# Patient Record
Sex: MALE | Race: WHITE | Employment: FULL TIME | URBAN - METROPOLITAN AREA
[De-identification: names, ages, dates, MRNs, and addresses within clinical notes are randomized per-mention and may not be internally consistent; named-entity substitution may affect disease eponyms.]

---

## 2017-08-20 ENCOUNTER — APPOINTMENT (OUTPATIENT)
Dept: NUCLEAR MEDICINE | Facility: HOSPITAL | Age: 42
DRG: 394 | End: 2017-08-20
Attending: EMERGENCY MEDICINE
Payer: COMMERCIAL

## 2017-08-20 ENCOUNTER — HOSPITAL ENCOUNTER (INPATIENT)
Facility: HOSPITAL | Age: 42
LOS: 3 days | Discharge: HOME | DRG: 394 | End: 2017-08-23
Attending: EMERGENCY MEDICINE | Admitting: HOSPITALIST
Payer: COMMERCIAL

## 2017-08-20 DIAGNOSIS — K62.5 RECTAL BLEEDING: Primary | ICD-10-CM

## 2017-08-20 PROBLEM — R73.9 HYPERGLYCEMIA: Status: ACTIVE | Noted: 2017-08-20

## 2017-08-20 PROBLEM — D64.9 ANEMIA: Status: ACTIVE | Noted: 2017-08-20

## 2017-08-20 LAB
ALBUMIN SERPL-MCNC: 3.3 G/DL (ref 3.5–4.8)
ALP LIVER SERPL-CCNC: 56 U/L (ref 45–117)
ALT SERPL-CCNC: 41 U/L (ref 17–63)
ANTIBODY SCREEN: NEGATIVE
AST SERPL-CCNC: 26 U/L (ref 15–41)
ATRIAL RATE: 72 BPM
BASOPHILS # BLD AUTO: 0.03 X10(3) UL (ref 0–0.1)
BASOPHILS # BLD AUTO: 0.03 X10(3) UL (ref 0–0.1)
BASOPHILS NFR BLD AUTO: 0.4 %
BASOPHILS NFR BLD AUTO: 0.5 %
BILIRUB SERPL-MCNC: 0.2 MG/DL (ref 0.1–2)
BUN BLD-MCNC: 16 MG/DL (ref 8–20)
CALCIUM BLD-MCNC: 8.1 MG/DL (ref 8.3–10.3)
CHLORIDE: 105 MMOL/L (ref 101–111)
CO2: 27 MMOL/L (ref 22–32)
CREAT BLD-MCNC: 1.11 MG/DL (ref 0.7–1.3)
EOSINOPHIL # BLD AUTO: 0.12 X10(3) UL (ref 0–0.3)
EOSINOPHIL # BLD AUTO: 0.15 X10(3) UL (ref 0–0.3)
EOSINOPHIL NFR BLD AUTO: 2.2 %
EOSINOPHIL NFR BLD AUTO: 2.2 %
ERYTHROCYTE [DISTWIDTH] IN BLOOD BY AUTOMATED COUNT: 15.7 % (ref 11.5–16)
ERYTHROCYTE [DISTWIDTH] IN BLOOD BY AUTOMATED COUNT: 15.7 % (ref 11.5–16)
GLUCOSE BLD-MCNC: 115 MG/DL (ref 70–99)
HCT VFR BLD AUTO: 25.6 % (ref 37–53)
HCT VFR BLD AUTO: 26.3 % (ref 37–53)
HGB BLD-MCNC: 8.2 G/DL (ref 13–17)
HGB BLD-MCNC: 8.2 G/DL (ref 13–17)
IMMATURE GRANULOCYTE COUNT: 0.02 X10(3) UL (ref 0–1)
IMMATURE GRANULOCYTE COUNT: 0.04 X10(3) UL (ref 0–1)
IMMATURE GRANULOCYTE RATIO %: 0.4 %
IMMATURE GRANULOCYTE RATIO %: 0.6 %
LYMPHOCYTES # BLD AUTO: 2.04 X10(3) UL (ref 0.9–4)
LYMPHOCYTES # BLD AUTO: 2.48 X10(3) UL (ref 0.9–4)
LYMPHOCYTES NFR BLD AUTO: 35.7 %
LYMPHOCYTES NFR BLD AUTO: 37.2 %
M PROTEIN MFR SERPL ELPH: 6.7 G/DL (ref 6.1–8.3)
MCH RBC QN AUTO: 26 PG (ref 27–33.2)
MCH RBC QN AUTO: 26.7 PG (ref 27–33.2)
MCHC RBC AUTO-ENTMCNC: 31.2 G/DL (ref 31–37)
MCHC RBC AUTO-ENTMCNC: 32 G/DL (ref 31–37)
MCV RBC AUTO: 83.4 FL (ref 80–99)
MCV RBC AUTO: 83.5 FL (ref 80–99)
MONOCYTES # BLD AUTO: 0.36 X10(3) UL (ref 0.1–0.6)
MONOCYTES # BLD AUTO: 0.56 X10(3) UL (ref 0.1–0.6)
MONOCYTES NFR BLD AUTO: 6.6 %
MONOCYTES NFR BLD AUTO: 8.1 %
NEUTROPHIL ABS PRELIM: 2.92 X10 (3) UL (ref 1.3–6.7)
NEUTROPHIL ABS PRELIM: 3.68 X10 (3) UL (ref 1.3–6.7)
NEUTROPHILS # BLD AUTO: 2.92 X10(3) UL (ref 1.3–6.7)
NEUTROPHILS # BLD AUTO: 3.68 X10(3) UL (ref 1.3–6.7)
NEUTROPHILS NFR BLD AUTO: 53 %
NEUTROPHILS NFR BLD AUTO: 53.1 %
P AXIS: 4 DEGREES
P-R INTERVAL: 178 MS
PLATELET # BLD AUTO: 268 10(3)UL (ref 150–450)
PLATELET # BLD AUTO: 304 10(3)UL (ref 150–450)
POTASSIUM SERPL-SCNC: 3.8 MMOL/L (ref 3.6–5.1)
Q-T INTERVAL: 420 MS
QRS DURATION: 88 MS
QTC CALCULATION (BEZET): 459 MS
R AXIS: 12 DEGREES
RBC # BLD AUTO: 3.07 X10(6)UL (ref 4.3–5.7)
RBC # BLD AUTO: 3.15 X10(6)UL (ref 4.3–5.7)
RED CELL DISTRIBUTION WIDTH-SD: 46.8 FL (ref 35.1–46.3)
RED CELL DISTRIBUTION WIDTH-SD: 47.5 FL (ref 35.1–46.3)
RH BLOOD TYPE: POSITIVE
SODIUM SERPL-SCNC: 140 MMOL/L (ref 136–144)
T AXIS: 7 DEGREES
VENTRICULAR RATE: 72 BPM
WBC # BLD AUTO: 5.5 X10(3) UL (ref 4–13)
WBC # BLD AUTO: 6.9 X10(3) UL (ref 4–13)

## 2017-08-20 PROCEDURE — 99223 1ST HOSP IP/OBS HIGH 75: CPT | Performed by: INTERNAL MEDICINE

## 2017-08-20 PROCEDURE — 78278 ACUTE GI BLOOD LOSS IMAGING: CPT | Performed by: EMERGENCY MEDICINE

## 2017-08-20 RX ORDER — POTASSIUM CHLORIDE 14.9 MG/ML
20 INJECTION INTRAVENOUS ONCE
Status: COMPLETED | OUTPATIENT
Start: 2017-08-20 | End: 2017-08-20

## 2017-08-20 RX ORDER — SODIUM CHLORIDE 9 MG/ML
INJECTION, SOLUTION INTRAVENOUS ONCE
Status: DISCONTINUED | OUTPATIENT
Start: 2017-08-20 | End: 2017-08-23

## 2017-08-20 RX ORDER — SODIUM CHLORIDE 9 MG/ML
INJECTION, SOLUTION INTRAVENOUS CONTINUOUS
Status: DISCONTINUED | OUTPATIENT
Start: 2017-08-20 | End: 2017-08-23

## 2017-08-20 RX ORDER — SODIUM CHLORIDE 9 MG/ML
INJECTION, SOLUTION INTRAVENOUS CONTINUOUS
Status: ACTIVE | OUTPATIENT
Start: 2017-08-20 | End: 2017-08-20

## 2017-08-20 NOTE — ED INITIAL ASSESSMENT (HPI)
Pt states has bright red blood in stools every bm x 2 weeks, saw gastro on Friday scheduled for colon on Tuesday but hgb was 9 but if still bleeding go to er

## 2017-08-20 NOTE — H&P
[facility]ITAL[facility]                                                               History & Physical         Nadyne Tillamook Patient Status:  Observation    1975 MRN [id number]   [facility] 3NW-A Attending MD Chika Horne Admission:  Methylcellulose, Laxative, (CITRUCEL) Oral Powder Take 1 rounded tablespoon in a large glass of water by mouth daily Disp: 850 g Rfl: 11   Hydrocortisone Acetate (ANUSOL-HC) 25 MG Rectal Suppos Place 1 suppository (25 mg total) rectally 2 (two) of acute blood loss, with dizziness-follow CBC, transfuse as needed. Quality:  · DVT Prophylaxis: SCD.   No pharmacological prophylaxis due to rectal bleeding  · CODE status: full  · Lundy: no    Plan of care discussed with patient and patient's wife

## 2017-08-20 NOTE — ED PROVIDER NOTES
Patient Seen in: [facility] Emergency Department    History   Patient presents with:  Dizziness (neurologic)  Bleeding (hematologic)    Stated Complaint: dizzy, rectal bleeding    HPI    Patient is a 23-year-old with a history of cholecystectomy and HPI.    Physical Exam   ED Triage Vitals  BP: 136/69 [08/20/17 1026]  Pulse: 75 [08/20/17 1026]  Resp: 18 [08/20/17 1026]  Temp: 98.5 °F (36.9 °C) [08/20/17 1026]  Temp src: n/a  SpO2: 98 % [08/20/17 1026]  O2 Device: None (Room air) [08/20/17 1105]    Cur QRS.           ============================================================  ED Course  ------------------------------------------------------------       MDM     40year-old with right red blood per rectum for 2 weeks and hemoglobin down to 8.2.   I spoke

## 2017-08-20 NOTE — CONSULTS
[facility]    Report of Consultation    José Miguel Negrete Patient Status:  Observation    1975 MRN [id number]   [facility] 3NW-A Attending Namita Sanchez MD   Hosp Day # 0 PCP Alicia Marino MD, Mahendra Johnson MD     Date of Adm history of stroke, recent passing out, recent dizziness, convulsions/seizures, dementia.   Cardiovascular: Denies history of heart murmur, leg swelling, history of rheumatic fever, pacemaker, chest pain or pressure after eating or when upset, automatic defi distention. Thyroid not enlarged. No lymphadenopathy. CV: S1 and S2 normal.  No murmurs or gallops. Lungs: Clear to auscultation. Abdomen: Soft and nondistended. Nontender. No masses. Bowel sounds are present. Back: No CVA tenderness.   Extremities:

## 2017-08-20 NOTE — PLAN OF CARE
GASTROINTESTINAL - ADULT    • Maintains or returns to baseline bowel function Not Progressing        HEMATOLOGIC - ADULT    • Free from bleeding injury Not Progressing        Patient/Family Goals    • Patient/Family Long Term Goal Not Progressing

## 2017-08-21 ENCOUNTER — APPOINTMENT (OUTPATIENT)
Dept: NUCLEAR MEDICINE | Facility: HOSPITAL | Age: 42
DRG: 394 | End: 2017-08-21
Attending: INTERNAL MEDICINE
Payer: COMMERCIAL

## 2017-08-21 ENCOUNTER — ANESTHESIA EVENT (OUTPATIENT)
Dept: ENDOSCOPY | Facility: HOSPITAL | Age: 42
DRG: 394 | End: 2017-08-21
Payer: COMMERCIAL

## 2017-08-21 ENCOUNTER — SURGERY (OUTPATIENT)
Age: 42
End: 2017-08-21

## 2017-08-21 ENCOUNTER — ANESTHESIA (OUTPATIENT)
Dept: ENDOSCOPY | Facility: HOSPITAL | Age: 42
DRG: 394 | End: 2017-08-21
Payer: COMMERCIAL

## 2017-08-21 LAB
ALBUMIN SERPL-MCNC: 3.1 G/DL (ref 3.5–4.8)
ALP LIVER SERPL-CCNC: 45 U/L (ref 45–117)
ALT SERPL-CCNC: 35 U/L (ref 17–63)
AST SERPL-CCNC: 21 U/L (ref 15–41)
BASOPHILS # BLD AUTO: 0.02 X10(3) UL (ref 0–0.1)
BASOPHILS NFR BLD AUTO: 0.4 %
BILIRUB SERPL-MCNC: 0.3 MG/DL (ref 0.1–2)
BUN BLD-MCNC: 9 MG/DL (ref 8–20)
CALCIUM BLD-MCNC: 7.9 MG/DL (ref 8.3–10.3)
CHLORIDE: 110 MMOL/L (ref 101–111)
CO2: 26 MMOL/L (ref 22–32)
CREAT BLD-MCNC: 0.86 MG/DL (ref 0.7–1.3)
EOSINOPHIL # BLD AUTO: 0.04 X10(3) UL (ref 0–0.3)
EOSINOPHIL NFR BLD AUTO: 0.7 %
ERYTHROCYTE [DISTWIDTH] IN BLOOD BY AUTOMATED COUNT: 15.8 % (ref 11.5–16)
GLUCOSE BLD-MCNC: 106 MG/DL (ref 70–99)
HCT VFR BLD AUTO: 23.2 % (ref 37–53)
HGB BLD-MCNC: 7 G/DL (ref 13–17)
HGB BLD-MCNC: 7.2 G/DL (ref 13–17)
HGB BLD-MCNC: 7.9 G/DL (ref 13–17)
IMMATURE GRANULOCYTE COUNT: 0.02 X10(3) UL (ref 0–1)
IMMATURE GRANULOCYTE RATIO %: 0.4 %
LYMPHOCYTES # BLD AUTO: 1.29 X10(3) UL (ref 0.9–4)
LYMPHOCYTES NFR BLD AUTO: 24 %
M PROTEIN MFR SERPL ELPH: 6.2 G/DL (ref 6.1–8.3)
MCH RBC QN AUTO: 26.3 PG (ref 27–33.2)
MCHC RBC AUTO-ENTMCNC: 31 G/DL (ref 31–37)
MCV RBC AUTO: 84.7 FL (ref 80–99)
MONOCYTES # BLD AUTO: 0.29 X10(3) UL (ref 0.1–0.6)
MONOCYTES NFR BLD AUTO: 5.4 %
NEUTROPHIL ABS PRELIM: 3.72 X10 (3) UL (ref 1.3–6.7)
NEUTROPHILS # BLD AUTO: 3.72 X10(3) UL (ref 1.3–6.7)
NEUTROPHILS NFR BLD AUTO: 69.1 %
PLATELET # BLD AUTO: 262 10(3)UL (ref 150–450)
POTASSIUM SERPL-SCNC: 3.7 MMOL/L (ref 3.6–5.1)
RBC # BLD AUTO: 2.74 X10(6)UL (ref 4.3–5.7)
RED CELL DISTRIBUTION WIDTH-SD: 48.2 FL (ref 35.1–46.3)
SODIUM SERPL-SCNC: 144 MMOL/L (ref 136–144)
WBC # BLD AUTO: 5.4 X10(3) UL (ref 4–13)

## 2017-08-21 PROCEDURE — 0DJD8ZZ INSPECTION OF LOWER INTESTINAL TRACT, VIA NATURAL OR ARTIFICIAL OPENING ENDOSCOPIC: ICD-10-PCS | Performed by: INTERNAL MEDICINE

## 2017-08-21 PROCEDURE — 99232 SBSQ HOSP IP/OBS MODERATE 35: CPT | Performed by: INTERNAL MEDICINE

## 2017-08-21 PROCEDURE — 30233N1 TRANSFUSION OF NONAUTOLOGOUS RED BLOOD CELLS INTO PERIPHERAL VEIN, PERCUTANEOUS APPROACH: ICD-10-PCS | Performed by: INTERNAL MEDICINE

## 2017-08-21 RX ORDER — DIPHENHYDRAMINE HYDROCHLORIDE 50 MG/ML
12.5 INJECTION INTRAMUSCULAR; INTRAVENOUS AS NEEDED
Status: DISCONTINUED | OUTPATIENT
Start: 2017-08-21 | End: 2017-08-21 | Stop reason: HOSPADM

## 2017-08-21 RX ORDER — SODIUM CHLORIDE 9 MG/ML
INJECTION, SOLUTION INTRAVENOUS ONCE
Status: COMPLETED | OUTPATIENT
Start: 2017-08-21 | End: 2017-08-21

## 2017-08-21 RX ORDER — SODIUM CHLORIDE, SODIUM LACTATE, POTASSIUM CHLORIDE, CALCIUM CHLORIDE 600; 310; 30; 20 MG/100ML; MG/100ML; MG/100ML; MG/100ML
INJECTION, SOLUTION INTRAVENOUS CONTINUOUS
Status: DISCONTINUED | OUTPATIENT
Start: 2017-08-21 | End: 2017-08-23

## 2017-08-21 RX ORDER — ONDANSETRON 2 MG/ML
4 INJECTION INTRAMUSCULAR; INTRAVENOUS AS NEEDED
Status: DISCONTINUED | OUTPATIENT
Start: 2017-08-21 | End: 2017-08-21 | Stop reason: HOSPADM

## 2017-08-21 RX ORDER — NALOXONE HYDROCHLORIDE 0.4 MG/ML
80 INJECTION, SOLUTION INTRAMUSCULAR; INTRAVENOUS; SUBCUTANEOUS AS NEEDED
Status: DISCONTINUED | OUTPATIENT
Start: 2017-08-21 | End: 2017-08-21 | Stop reason: HOSPADM

## 2017-08-21 RX ORDER — HYDROMORPHONE HYDROCHLORIDE 1 MG/ML
0.4 INJECTION, SOLUTION INTRAMUSCULAR; INTRAVENOUS; SUBCUTANEOUS EVERY 5 MIN PRN
Status: DISCONTINUED | OUTPATIENT
Start: 2017-08-21 | End: 2017-08-21 | Stop reason: HOSPADM

## 2017-08-21 RX ORDER — POTASSIUM CHLORIDE 14.9 MG/ML
20 INJECTION INTRAVENOUS ONCE
Status: COMPLETED | OUTPATIENT
Start: 2017-08-21 | End: 2017-08-21

## 2017-08-21 RX ORDER — METOCLOPRAMIDE HYDROCHLORIDE 5 MG/ML
10 INJECTION INTRAMUSCULAR; INTRAVENOUS AS NEEDED
Status: DISCONTINUED | OUTPATIENT
Start: 2017-08-21 | End: 2017-08-21 | Stop reason: HOSPADM

## 2017-08-21 NOTE — PROGRESS NOTES
8/21/17 CALL PLACED TO DR Kelly Carbajal TO INFORM HIM THAT NUCLEAR MED TEST (MECKELS DIVERTICULUM) CANNOT BE DONE TONIGHT PER [facility] NUCLEAR MED STAFF REPORT AS \"THERE IS STILL SOME LEFT OVERS OF THE GI BLOOD LOSS SCAN FROM YESTERDAY IN HIS SYSTEM THAT WILL AFFECT

## 2017-08-21 NOTE — H&P
History & Physical Examination    Patient Name: Shirley Guerin  MRN: [id number]  [facility]: [id number]  YOB: 1975    Diagnosis: Rectal bleeding [K62.5]      Present Illness:  Shirley Guerin is a 40year old male is here Rectal bleeding [K6

## 2017-08-21 NOTE — ANESTHESIA POSTPROCEDURE EVALUATION
[address] [place] [address] Patient Status:  Inpatient   Age/Gender 40year old male MRN [id number]   Location [facility]. Attending Chandler Latham MD   Hosp Day # 1 PCP Kiki Vázquez MD, Mannie Pederson MD       Anesthesia Post-op No

## 2017-08-21 NOTE — ANESTHESIA PREPROCEDURE EVALUATION
PRE-OP EVALUATION    Patient Name: Sherry Head    Pre-op Diagnosis: ADD ON    Procedure(s):  COLONOSCOPY WITH MAC    Surgeon(s) and Role:     * Chico Altman MD - Primary    Pre-op vitals reviewed.   Temp: 97.9 °F (36.6 °C)  Pulse: 70  Resp: 1 08/21/2017   MCV 83.9 08/18/2017   MCH 26.3 (L) 08/21/2017   MCH 26.4 (L) 08/18/2017   MCHC 31.0 08/21/2017   MCHC 31.5 08/18/2017   RDW 15.8 08/21/2017   RDW 16.0 08/18/2017   .0 08/21/2017    08/18/2017       Lab Results  Component Value Da

## 2017-08-21 NOTE — OPERATIVE REPORT
OPERATIVE REPORT   PATIENT NAME: Keeley John  MRN: [id number]  DATE OF OPERATION: 8/21/2017  PREOPERATIVE DIAGNOSIS: rectal bleeding  POSTOPERATIVE DIAGNOSES   1. No diverticulosis  2.  Normal ileum without Meckel's but ileoscopy only up to 15 cms  3 medication list.   PREP Quality indicators:  Aronchick scale    EXCELLENT - small volume of clear liquid > 95% of mucosa see  GOOD  - clear liquid covering up to 25% of mucosa, but > 90% of mucosa seen  FAIR  - some semisolid stool could be suctioned but >

## 2017-08-21 NOTE — PROGRESS NOTES
8/21/17 PER PT REQUEST, CALL PLACED TO DR JAMES TO CLARIFY BLOOD TRANSFUSION ORDER. PT STATES \"I THOUGHT THEY WERE GOING TO RECHECK MY HGB AT 1130 AND THEN GIVE ME BLOOD IF IT IS STILL LOW. I WOULD RATHER NOT GET SOMEONE ELSE'S BLOOD IF I DON'T NEED IT. \

## 2017-08-21 NOTE — PROGRESS NOTES
8/21/17 FIRST AND ONLY UNIT OF PRBC INITIATED AT THIS TIME PER ORDER (WITNESSED AND VERIFIED BY THEODORE RN). VITALS STABLE. SIGNED CONSENT REMAINS ON CHART.  S+S OF POSSIBLE BLOOD TRANSFUSION REACTION OF WHICH TO NOTIFY WRITER/ STAFF REVIEWED WITH PT AND PT

## 2017-08-21 NOTE — PROGRESS NOTES
[facility]  Progress Note    Anant Maynard Patient Status:  Observation    1975 MRN [id number]   [facility] 3NW-A Attending Karma Marcial MD   Hosp Day # 0 PCP Jordan Schofield MD, Rosa Carmichael MD     CC:  Rectal bleeding, di 08/21/2017   CO2 26.0 08/21/2017    08/21/2017   CA 7.9 08/21/2017   ALB 3.1 08/21/2017   ALKPHO 45 08/21/2017   BILT 0.3 08/21/2017   TP 6.2 08/21/2017   AST 21 08/21/2017   ALT 35 08/21/2017       Imaging:  NUCLEAR MEDICINE GI BLOOD LOSS STUDY point Mr. Markie Yoo  is expected to be discharge to: home        Questions/concerns and Plan of care were discussed with patient          Marlys Borrego MD  8/21/2017  8:35 AM

## 2017-08-21 NOTE — PROGRESS NOTES
8/21/17 PT LEFT UNIT AT THIS TIME FOR COLONOSCOPY. PT LEFT UNIT WITH IVF INFUSING VIA PATENT IV SITE AND PREVIOUSLY SIGNED CONSENT IN CHART. A+OX3. RA. HGB 7.0. PT AWARE THAT BLOOD TRANSFUSION WILL OCCUR UPON RETURN FROM COLONOSCOPY PER DR Peyton Roque.  ALL QUES

## 2017-08-21 NOTE — PAYOR COMM NOTE
--------------  ADMISSION REVIEW     Payor: Mary Wong S #:  [id number]  Authorization Number: N/A    Admit date: N/A  Admit time: N/A       Admitting Physician: James English DO  Attending Physician:  Aurora Duque MD  Primary Car Methylcellulose, Laxative, (CITRUCEL) Oral Powder,  Take 1 rounded tablespoon in a large glass of water by mouth daily   Hydrocortisone Acetate (ANUSOL-HC) 25 MG Rectal Suppos,  Place 1 suppository (25 mg total) rectally 2 (two) times daily as needed for H following orders were created for panel order CBC WITH DIFFERENTIAL WITH PLATELET.   Procedure                               Abnormality         Status                     ---------                               -----------         ------ :  Carlos Zaman MD (Physician)         [facility] HOSPITALIST                                                               History & Physical         Nicki Loop Patient Status:  Observation    1975 MRN [id number]   [facility] Hemorrhoids. Disp: 14 suppository Rfl: 1       Review of Systems:  A comprehensive 14 point review of systems was completed. Pertinent positives and negatives noted in the the HPI.     Physical Exam:     Vital signs: Blood pressure 114/64, pulse 76, temper discussed with[MJ.1] patient and patient's wife at bedside[MJ. 2]      Discussed with ER Physician.               Carolina Olson MD  8/20/2017[MJ.1]             MEDICATIONS ADMINISTERED IN LAST 1 DAY:  polyethylene glycol (GOLYTELY) solution 4,000 mL     Date Exam:      BP 98/60   Pulse 70   Temp 97.9 °F (36.6 °C) (Oral)   Resp 15   Ht 5' 6\" (1.676 m)   Wt 220 lb (99.8 kg)   SpO2 100%   BMI 35.51 kg/m²         General appearance: alert and cooperative  Head: Normocephalic, without obvious abnormality, atraumat infiltration liver.  Status post cholecystectomy.           =====  CONCLUSION:       No evidence of active GI bleeding.           Dictated by: Arvind Nicole MD on 8/20/2017 at 18:51         Meds:      Current Facility-Administered Medications:  potassium ch

## 2017-08-22 ENCOUNTER — APPOINTMENT (OUTPATIENT)
Dept: NUCLEAR MEDICINE | Facility: HOSPITAL | Age: 42
DRG: 394 | End: 2017-08-22
Attending: INTERNAL MEDICINE
Payer: COMMERCIAL

## 2017-08-22 LAB
BASOPHILS # BLD AUTO: 0.02 X10(3) UL (ref 0–0.1)
BASOPHILS # BLD AUTO: 0.03 X10(3) UL (ref 0–0.1)
BASOPHILS NFR BLD AUTO: 0.3 %
BASOPHILS NFR BLD AUTO: 0.5 %
EOSINOPHIL # BLD AUTO: 0.1 X10(3) UL (ref 0–0.3)
EOSINOPHIL # BLD AUTO: 0.11 X10(3) UL (ref 0–0.3)
EOSINOPHIL NFR BLD AUTO: 1.5 %
EOSINOPHIL NFR BLD AUTO: 1.6 %
ERYTHROCYTE [DISTWIDTH] IN BLOOD BY AUTOMATED COUNT: 15.9 % (ref 11.5–16)
ERYTHROCYTE [DISTWIDTH] IN BLOOD BY AUTOMATED COUNT: 16 % (ref 11.5–16)
HCT VFR BLD AUTO: 26 % (ref 37–53)
HCT VFR BLD AUTO: 27.3 % (ref 37–53)
HGB BLD-MCNC: 7.8 G/DL (ref 13–17)
HGB BLD-MCNC: 8.4 G/DL (ref 13–17)
IMMATURE GRANULOCYTE COUNT: 0.02 X10(3) UL (ref 0–1)
IMMATURE GRANULOCYTE COUNT: 0.02 X10(3) UL (ref 0–1)
IMMATURE GRANULOCYTE RATIO %: 0.3 %
IMMATURE GRANULOCYTE RATIO %: 0.3 %
LYMPHOCYTES # BLD AUTO: 1.93 X10(3) UL (ref 0.9–4)
LYMPHOCYTES # BLD AUTO: 2.21 X10(3) UL (ref 0.9–4)
LYMPHOCYTES NFR BLD AUTO: 26.7 %
LYMPHOCYTES NFR BLD AUTO: 35.6 %
MCH RBC QN AUTO: 25.1 PG (ref 27–33.2)
MCH RBC QN AUTO: 25.6 PG (ref 27–33.2)
MCHC RBC AUTO-ENTMCNC: 30 G/DL (ref 31–37)
MCHC RBC AUTO-ENTMCNC: 30.8 G/DL (ref 31–37)
MCV RBC AUTO: 83.2 FL (ref 80–99)
MCV RBC AUTO: 83.6 FL (ref 80–99)
MONOCYTES # BLD AUTO: 0.39 X10(3) UL (ref 0.1–0.6)
MONOCYTES # BLD AUTO: 0.48 X10(3) UL (ref 0.1–0.6)
MONOCYTES NFR BLD AUTO: 6.3 %
MONOCYTES NFR BLD AUTO: 6.6 %
NEUTROPHIL ABS PRELIM: 3.46 X10 (3) UL (ref 1.3–6.7)
NEUTROPHIL ABS PRELIM: 4.67 X10 (3) UL (ref 1.3–6.7)
NEUTROPHILS # BLD AUTO: 3.46 X10(3) UL (ref 1.3–6.7)
NEUTROPHILS # BLD AUTO: 4.67 X10(3) UL (ref 1.3–6.7)
NEUTROPHILS NFR BLD AUTO: 55.7 %
NEUTROPHILS NFR BLD AUTO: 64.6 %
PLATELET # BLD AUTO: 260 10(3)UL (ref 150–450)
PLATELET # BLD AUTO: 264 10(3)UL (ref 150–450)
POTASSIUM SERPL-SCNC: 4 MMOL/L (ref 3.6–5.1)
RBC # BLD AUTO: 3.11 X10(6)UL (ref 4.3–5.7)
RBC # BLD AUTO: 3.28 X10(6)UL (ref 4.3–5.7)
RED CELL DISTRIBUTION WIDTH-SD: 48.4 FL (ref 35.1–46.3)
RED CELL DISTRIBUTION WIDTH-SD: 48.4 FL (ref 35.1–46.3)
WBC # BLD AUTO: 6.2 X10(3) UL (ref 4–13)
WBC # BLD AUTO: 7.2 X10(3) UL (ref 4–13)

## 2017-08-22 PROCEDURE — 99232 SBSQ HOSP IP/OBS MODERATE 35: CPT | Performed by: INTERNAL MEDICINE

## 2017-08-22 PROCEDURE — 78290 INTESTINE IMAGING: CPT | Performed by: INTERNAL MEDICINE

## 2017-08-22 NOTE — CONSULTS
[facility]  Report of Consultation    Francisco J Parkinson Patient Status:  Inpatient    1975 MRN [id number]   [facility] 3NW-A Attending Tan Barroso MD   Hosp Day # 2 PCP Rufus Jones MD, Arabella Vidales MD     [facility] History:  No date: CHOLECYSTECTOMY  8/21/2017: COLONOSCOPY N/A      Comment: Procedure: COLONOSCOPY;  Surgeon: Galina Alva MD;  Location: [address] [place] [address]  No date: HERNIA SURGERY  No date: REMOVAL GALLBLADDER  No date: REPAIR [facility] banding as an outpatient. · At the end of the discussion, the patient states that he is a [facility] patient and wishes to see a [facility] surgeon. · Further management per [facility] GI/Surgery. I spent 35 minutes face to face with the patient.  More than 50% of that dale

## 2017-08-22 NOTE — PROGRESS NOTES
[facility]  Progress Note    Nicki Loop Patient Status:  Observation    1975 MRN [id number]   [facility] 3NW-A Attending Carlos Zaman MD   Hosp Day # 2 PCP Swetha Tovar MD, Maciej Ramirez MD     CC:  Rectal bleeding, di GI bleeding. Red blood in stool. TECHNIQUE:  27.3 mCi Tc99m autologously labelled RBC's were re-injected intravenously, and dynamic images of the abdomen were obtained in the anterior projection for at least one hour.  A SPECT CT was performed due to te home        Questions/concerns and Plan of care were discussed with patient    F/u with regular PCP Swetha Tovar MD, Maciej Ramirez MD in 1 week        Willy Rivera MD  8/22/2017

## 2017-08-22 NOTE — PLAN OF CARE
Blood transfusion completed, pt tolerated well, no s/s of transfusion reaction. 2345- Post transfusion Hbg 7.9. Message sent to Dr Indira Vergara to notify.

## 2017-08-22 NOTE — PROGRESS NOTES
8/22/17 CALL PLACED TO DR Moi Malik AS PT STATES HE STILL HAS QUESTIONS ABOUT PLAN OF CARE AND CONCERN FOR CONTINUED RECTAL BLEEDING. PT SEEN BY DR ORTEGA TODAY BUT INQUIRING IF HE SHOULD BE SEEN BY [facility] SURGEON.  AWAITING RETURN CALL FROM DR Moi Malik REGARDING S

## 2017-08-22 NOTE — PROGRESS NOTES
8/21/17 PT RETURNED FROM COLONOSCOPY AT THIS TIME IN STABLE CONDITION. A+OX3. RA. CPOX ON. DENIES PAIN. DENIES NAUSEA. NO ACTIVE BLEEDING NOTED OR REPORTED. IVF INFUSING. FAMILY AT BEDSIDE. TO REVIEW POST PROCEDURE ORDERS. CONT TO MONITOR.

## 2017-08-22 NOTE — PROGRESS NOTES
8/22/17 CALL PLACED TO DR ORTEGA ( ON CALL FOR DR KRAUSE) FOR NEW CONSULT PER ORDER. ALL QUESTIONS ANSWERED. NO NEW ORDERS RECEIVED. CONT TO MONITOR.

## 2017-08-23 VITALS
RESPIRATION RATE: 18 BRPM | DIASTOLIC BLOOD PRESSURE: 49 MMHG | HEART RATE: 67 BPM | SYSTOLIC BLOOD PRESSURE: 105 MMHG | HEIGHT: 66 IN | OXYGEN SATURATION: 97 % | TEMPERATURE: 99 F | BODY MASS INDEX: 35.36 KG/M2 | WEIGHT: 220 LBS

## 2017-08-23 LAB
BASOPHILS # BLD AUTO: 0.03 X10(3) UL (ref 0–0.1)
BASOPHILS NFR BLD AUTO: 0.4 %
BLOOD TYPE BARCODE: 7300
EOSINOPHIL # BLD AUTO: 0.13 X10(3) UL (ref 0–0.3)
EOSINOPHIL NFR BLD AUTO: 1.8 %
ERYTHROCYTE [DISTWIDTH] IN BLOOD BY AUTOMATED COUNT: 15.7 % (ref 11.5–16)
HCT VFR BLD AUTO: 26.7 % (ref 37–53)
HGB BLD-MCNC: 8.1 G/DL (ref 13–17)
HGB BLD-MCNC: 8.3 G/DL (ref 13–17)
IMMATURE GRANULOCYTE COUNT: 0.02 X10(3) UL (ref 0–1)
IMMATURE GRANULOCYTE RATIO %: 0.3 %
LYMPHOCYTES # BLD AUTO: 1.73 X10(3) UL (ref 0.9–4)
LYMPHOCYTES NFR BLD AUTO: 24.2 %
MCH RBC QN AUTO: 25.5 PG (ref 27–33.2)
MCHC RBC AUTO-ENTMCNC: 31.1 G/DL (ref 31–37)
MCV RBC AUTO: 81.9 FL (ref 80–99)
MONOCYTES # BLD AUTO: 0.57 X10(3) UL (ref 0.1–0.6)
MONOCYTES NFR BLD AUTO: 8 %
NEUTROPHIL ABS PRELIM: 4.68 X10 (3) UL (ref 1.3–6.7)
NEUTROPHILS # BLD AUTO: 4.68 X10(3) UL (ref 1.3–6.7)
NEUTROPHILS NFR BLD AUTO: 65.3 %
PLATELET # BLD AUTO: 267 10(3)UL (ref 150–450)
RBC # BLD AUTO: 3.26 X10(6)UL (ref 4.3–5.7)
RED CELL DISTRIBUTION WIDTH-SD: 47 FL (ref 35.1–46.3)
WBC # BLD AUTO: 7.2 X10(3) UL (ref 4–13)

## 2017-08-23 PROCEDURE — 99239 HOSP IP/OBS DSCHRG MGMT >30: CPT | Performed by: INTERNAL MEDICINE

## 2017-08-23 RX ORDER — FERROUS SULFATE 325(65) MG
325 TABLET ORAL
Qty: 30 TABLET | Refills: 0 | Status: SHIPPED | OUTPATIENT
Start: 2017-08-23 | End: 2019-01-06 | Stop reason: ALTCHOICE

## 2017-08-23 NOTE — PROGRESS NOTES
AT 1000 PT IS UP TO THE BATHROOM AND STATES HAS SOFT BM WITH MODERATE AMOUNT OF BLOOD NOTED, PT STATES HE BECOMES DIZZY AND HAS PALPITATIONS WHEN UP, ASSISTED BACK TO BED, VS STABLE, DR JAMES NOTIFIED AND STAT HBG DONE AND RESULTS GIVEN TO DR Jacqui Gaxiola

## 2017-08-23 NOTE — PROGRESS NOTES
[facility]    Progress Note    Zaira Lacy Patient Status:  Inpatient    1975 MRN [id number]   [facility] 3NW-A Attending Ken Rodriguez MD   Hosp Day # 2 PCP Steven Rivera MD, Slime Mcghee MD     Subjective:  Christi Godwin

## 2017-08-23 NOTE — CONSULTS
[facility]  Report of Consultation    Sherry Head Patient Status:  Inpatient    1975 MRN [id number]   [facility] 3NW-A Attending Agustin Naqvi MD   Hosp Day # 3 PCP Ralph Manning MD, Shahram Luke MD     Reason for [facility] Current Outpatient Prescriptions on File Prior to Encounter:  Methylcellulose, Laxative, (CITRUCEL) Oral Powder Take 1 rounded tablespoon in a large glass of water by mouth daily Disp: 850 g Rfl: 11   Hydrocortisone Acetate (ANUSOL-HC) 25 MG Rectal Suppo in stool. TECHNIQUE:  27.3 mCi Tc99m autologously labelled RBC's were re-injected intravenously, and dynamic images of the abdomen were obtained in the anterior projection for at least one hour.  A SPECT CT was performed due to technologist questioning [facility] and f/u as scheduled this afternoon for possible hemorrhoid banding or discussion of traditional hemorrhoidectomy.    Discussed with patient recommendation, all questions answered  D/W Dr Yon Bush, Chey [phone]

## 2017-08-23 NOTE — PROGRESS NOTES
[facility]  Progress Note    Lyudmila Crews Patient Status:  Observation    1975 MRN [id number]   [facility] 3NW-A Attending Ceferino Manuel MD   Hosp Day # 3 PCP Joelle Meehan MD, Maurisio Meyer MD     CC:  Rectal bleeding, di blood in stool. TECHNIQUE:  27.3 mCi Tc99m autologously labelled RBC's were re-injected intravenously, and dynamic images of the abdomen were obtained in the anterior projection for at least one hour.  A SPECT CT was performed due to technologist Rochelle Mendoza home        Questions/concerns and Plan of care were discussed with patient    F/u with regular PCP TANG LAW, Mahendra Johnson MD in 1 week ad [facility] and [facility] surgeon as directed        Mayco Altamirano MD  8/23/2017

## 2017-08-23 NOTE — PAYOR COMM NOTE
--------------  DISCHARGE REVIEW    Payor: ISAIAS CHENG  Subscriber #:  [id number]  Authorization Number: [id number]    Admit date: 8/20/17  Admit time:  7629  Discharge Date: 8/23/2017  1:10 PM     Admitting Physician: DO Anjel Gatica No date: REMOVAL GALLBLADDER  No date: REPAIR ING HERNIA,5+Y/O,REDUCIBL     Family History:           Family History   Problem Relation Age of Onset   • Hypertension Maternal Grandmother     • Diabetes Maternal Grandmother           Social History:      re MCV  83.5  84.7   --    --   83.6  83.2  81.9   --    PLT  268.0  262.0   --    --   264.0  260.0  267.0   --     < > = values in this interval not displayed.              Recent Labs   Lab  08/21/17   0542  08/22/17   0534   NA  144   --    K  3.7  4.0 PROCEDURE:  NUCLEAR MEDICINE GI BLEED STUDY FOR MECKEL'S DIVERTICULUM  COMPARISON:  None.   INDICATIONS:  rectal bleeding; negative colonoscopy  TECHNIQUE:  Tc-99m pertechnetate was injected IV, and dynamic images were obtained for 30 minutes over the abdom

## 2017-08-23 NOTE — PROGRESS NOTES
RECEIVED CALL FROM MARIS VERDUZCO WITH [facility] AND SHE STATES SHE HAS SPOKEN TO PT AND PLAN IS FROM HIM TO FOLLOW UP TODAY AT 1400 WITH DR HUBER [facility] AS SCHEDULED

## 2017-08-23 NOTE — DISCHARGE SUMMARY
[facility]  Discharge Summary    Nicki Loop Patient Status:  Inpatient    1975 MRN [id number]   [facility] 3NW-A Attending Carlos Zaman MD   Hosp Day # 3 PCP Swetha Tovar MD, Maciej Ramirez MD     Date of Admission:  felt worse today and came to the emergency room.   Hemoglobin in emergency room was 8.2    Brief Synopsis: [facility] gastroenterology was consulted from the emergency room and was seen by GI Dr. Cindy Chin who ordered for a nuclear medicine GI blood loss study [facility] colonoscopy on 8/21/2017 by Braxton Sol MD which showed Enlarged friable looking hemorrhoids with black spot suggestive of bleeding.     Incidental or significant findings and recommendations (brief descriptions):  • none    Lab/Test results pe lesions  Neurologic: Awake,alert,nonfocal  Psych: Mood and affect appears normal      Discharge Condition: stable    Patient Discharge Instructions: See electronic chart       30 minutes on discharge    Sienna Vázquez MD  8/23/2017  12:47 PM

## 2017-08-23 NOTE — PROGRESS NOTES
DISCHARGED TO HOME PER WHEELCHAIR, DISCHARGED WITH INSTRUCTIONS, VERBALIZES UNDERSTANDING OF ALL INSTRUCTIONS, MEDICATIONS AND FOLLOW UP APPOINTMENT THIS AFTERNOON WITH DR Mikhail Greene, ALSO INSTRUCTED REGARDING FERROUS SULFATE SENT TO HIS PHARMACY FOR

## 2017-08-23 NOTE — PLAN OF CARE
GASTROINTESTINAL - ADULT    • Minimal or absence of nausea and vomiting Adequate for Discharge    • Maintains or returns to baseline bowel function Adequate for Discharge        HEMATOLOGIC - ADULT    • Maintains hematologic stability Adequate for Discharg

## 2017-08-24 ENCOUNTER — ANESTHESIA EVENT (OUTPATIENT)
Dept: SURGERY | Facility: HOSPITAL | Age: 42
End: 2017-08-24

## 2017-08-24 ENCOUNTER — ANESTHESIA (OUTPATIENT)
Dept: SURGERY | Facility: HOSPITAL | Age: 42
End: 2017-08-24

## 2017-08-24 ENCOUNTER — HOSPITAL ENCOUNTER (OUTPATIENT)
Facility: HOSPITAL | Age: 42
Setting detail: OBSERVATION
Discharge: HOME | End: 2017-08-26
Attending: SURGERY | Admitting: SURGERY
Payer: COMMERCIAL

## 2017-08-24 ENCOUNTER — SURGERY (OUTPATIENT)
Age: 42
End: 2017-08-24

## 2017-08-24 LAB
ANTIBODY SCREEN: NEGATIVE
HGB BLD-MCNC: 8.5 G/DL (ref 13–17)
RH BLOOD TYPE: POSITIVE

## 2017-08-24 PROCEDURE — 86901 BLOOD TYPING SEROLOGIC RH(D): CPT | Performed by: SURGERY

## 2017-08-24 PROCEDURE — 04LY0ZZ OCCLUSION OF LOWER ARTERY, OPEN APPROACH: ICD-10-PCS | Performed by: SURGERY

## 2017-08-24 PROCEDURE — 06BY0ZC EXCISION OF HEMORRHOIDAL PLEXUS, OPEN APPROACH: ICD-10-PCS | Performed by: SURGERY

## 2017-08-24 PROCEDURE — 86900 BLOOD TYPING SEROLOGIC ABO: CPT | Performed by: SURGERY

## 2017-08-24 PROCEDURE — 88304 TISSUE EXAM BY PATHOLOGIST: CPT | Performed by: SURGERY

## 2017-08-24 PROCEDURE — 86850 RBC ANTIBODY SCREEN: CPT | Performed by: SURGERY

## 2017-08-24 PROCEDURE — 0DQP7ZZ REPAIR RECTUM, VIA NATURAL OR ARTIFICIAL OPENING: ICD-10-PCS | Performed by: SURGERY

## 2017-08-24 PROCEDURE — 85018 HEMOGLOBIN: CPT | Performed by: SURGERY

## 2017-08-24 RX ORDER — HEPARIN SODIUM 5000 [USP'U]/ML
5000 INJECTION, SOLUTION INTRAVENOUS; SUBCUTANEOUS ONCE
Status: COMPLETED | OUTPATIENT
Start: 2017-08-24 | End: 2017-08-24

## 2017-08-24 RX ORDER — NALOXONE HYDROCHLORIDE 0.4 MG/ML
80 INJECTION, SOLUTION INTRAMUSCULAR; INTRAVENOUS; SUBCUTANEOUS AS NEEDED
Status: DISCONTINUED | OUTPATIENT
Start: 2017-08-24 | End: 2017-08-24 | Stop reason: HOSPADM

## 2017-08-24 RX ORDER — ONDANSETRON 2 MG/ML
4 INJECTION INTRAMUSCULAR; INTRAVENOUS AS NEEDED
Status: DISCONTINUED | OUTPATIENT
Start: 2017-08-24 | End: 2017-08-24 | Stop reason: HOSPADM

## 2017-08-24 RX ORDER — HYDROMORPHONE HYDROCHLORIDE 1 MG/ML
0.4 INJECTION, SOLUTION INTRAMUSCULAR; INTRAVENOUS; SUBCUTANEOUS EVERY 5 MIN PRN
Status: DISCONTINUED | OUTPATIENT
Start: 2017-08-24 | End: 2017-08-24 | Stop reason: HOSPADM

## 2017-08-24 RX ORDER — SODIUM CHLORIDE, SODIUM LACTATE, POTASSIUM CHLORIDE, CALCIUM CHLORIDE 600; 310; 30; 20 MG/100ML; MG/100ML; MG/100ML; MG/100ML
INJECTION, SOLUTION INTRAVENOUS CONTINUOUS
Status: DISCONTINUED | OUTPATIENT
Start: 2017-08-24 | End: 2017-08-26

## 2017-08-24 RX ORDER — HYDROCODONE BITARTRATE AND ACETAMINOPHEN 10; 325 MG/1; MG/1
1 TABLET ORAL AS NEEDED
Status: DISCONTINUED | OUTPATIENT
Start: 2017-08-24 | End: 2017-08-24 | Stop reason: HOSPADM

## 2017-08-24 RX ORDER — ONDANSETRON 2 MG/ML
4 INJECTION INTRAMUSCULAR; INTRAVENOUS EVERY 6 HOURS PRN
Status: DISCONTINUED | OUTPATIENT
Start: 2017-08-24 | End: 2017-08-26

## 2017-08-24 RX ORDER — HYDROCODONE BITARTRATE AND ACETAMINOPHEN 7.5; 325 MG/1; MG/1
1 TABLET ORAL EVERY 4 HOURS PRN
Qty: 30 TABLET | Refills: 0 | Status: SHIPPED | OUTPATIENT
Start: 2017-08-24 | End: 2017-09-03

## 2017-08-24 RX ORDER — LIDOCAINE HYDROCHLORIDE 20 MG/ML
JELLY TOPICAL AS NEEDED
Status: DISCONTINUED | OUTPATIENT
Start: 2017-08-24 | End: 2017-08-24 | Stop reason: HOSPADM

## 2017-08-24 RX ORDER — ENOXAPARIN SODIUM 100 MG/ML
40 INJECTION SUBCUTANEOUS DAILY
Status: DISCONTINUED | OUTPATIENT
Start: 2017-08-25 | End: 2017-08-26

## 2017-08-24 RX ORDER — MORPHINE SULFATE 4 MG/ML
4 INJECTION, SOLUTION INTRAMUSCULAR; INTRAVENOUS EVERY 2 HOUR PRN
Status: DISCONTINUED | OUTPATIENT
Start: 2017-08-24 | End: 2017-08-26

## 2017-08-24 RX ORDER — HYDROMORPHONE HYDROCHLORIDE 1 MG/ML
INJECTION, SOLUTION INTRAMUSCULAR; INTRAVENOUS; SUBCUTANEOUS
Status: COMPLETED
Start: 2017-08-24 | End: 2017-08-24

## 2017-08-24 RX ORDER — BUPIVACAINE HYDROCHLORIDE 5 MG/ML
INJECTION, SOLUTION EPIDURAL; INTRACAUDAL AS NEEDED
Status: DISCONTINUED | OUTPATIENT
Start: 2017-08-24 | End: 2017-08-24 | Stop reason: HOSPADM

## 2017-08-24 RX ORDER — HYDROCODONE BITARTRATE AND ACETAMINOPHEN 10; 325 MG/1; MG/1
2 TABLET ORAL AS NEEDED
Status: DISCONTINUED | OUTPATIENT
Start: 2017-08-24 | End: 2017-08-24 | Stop reason: HOSPADM

## 2017-08-24 NOTE — H&P
H and P from yesterday noted. No changes today. Had additional bleeding last evening. RBA disc w the pt and his wife. Hg 8.5 today.

## 2017-08-24 NOTE — ANESTHESIA PREPROCEDURE EVALUATION
PRE-OP EVALUATION    Patient Name: Francisco J     Pre-op Diagnosis: HEMORRHOIDS, RECTAL BLEEDING    Procedure(s):   HEMORRHOIDECTOMY, TRANSANAL HEMORRHOIDAL DEARTERIALIZATION WITH RECTOPEXY    Surgeon(s) and Role:     * Baldev Sargent MD - Primary CHOLECYSTECTOMY  8/21/2017: COLONOSCOPY N/A      Comment: Procedure: COLONOSCOPY;  Surgeon: Angela Stuart MD;  Location: [facility] ENDOSCOPY  No date: HERNIA SURGERY      Comment: ventral  No date: REMOVAL GALLBLADDER  No date: REPAIR [address]

## 2017-08-25 RX ORDER — HYDROCODONE BITARTRATE AND ACETAMINOPHEN 5; 325 MG/1; MG/1
2 TABLET ORAL EVERY 6 HOURS PRN
Status: DISCONTINUED | OUTPATIENT
Start: 2017-08-25 | End: 2017-08-26

## 2017-08-25 RX ORDER — MAGNESIUM OXIDE 400 MG (241.3 MG MAGNESIUM) TABLET
400 TABLET DAILY
Status: DISCONTINUED | OUTPATIENT
Start: 2017-08-25 | End: 2017-08-26

## 2017-08-25 RX ORDER — HYDROCODONE BITARTRATE AND ACETAMINOPHEN 5; 325 MG/1; MG/1
1 TABLET ORAL EVERY 6 HOURS PRN
Status: DISCONTINUED | OUTPATIENT
Start: 2017-08-25 | End: 2017-08-26

## 2017-08-25 RX ORDER — POLYETHYLENE GLYCOL 3350 17 G/17G
17 POWDER, FOR SOLUTION ORAL DAILY
Status: DISCONTINUED | OUTPATIENT
Start: 2017-08-25 | End: 2017-08-26

## 2017-08-25 RX ORDER — HYDROCODONE BITARTRATE AND ACETAMINOPHEN 5; 325 MG/1; MG/1
1-2 TABLET ORAL EVERY 6 HOURS PRN
Status: DISCONTINUED | OUTPATIENT
Start: 2017-08-25 | End: 2017-08-25 | Stop reason: SDUPTHER

## 2017-08-25 NOTE — CM/SW NOTE
Pt has been screened per chart review and during nursing rounds. Pt has no identified needs at this time. SW/CM will remain available for support and any discharge needs. Joo Roy,  -Ext.  [phone]     08/25/17 1100   CM/SW Screening   Refer

## 2017-08-25 NOTE — PAYOR COMM NOTE
--------------  ADMISSION REVIEW     Payor: [facility]  Subscriber #:  [id number]  Authorization Number: N/A    Admit date: 8/24/17       Admitting Physician: Neftali Cabrera MD  Attending Physician:  Neftali Cabrera MD  Primary Care Physician: Angie Pierson 8/24/2017 1800 New Bag (none) Intravenous Dressler, Willaim Mayans, RN      lactated ringers infusion     Date Action Dose Route User    8/25/2017 0470 New Bag (none) Intravenous Marquita Dunn RN         morphINE sulfate (PF) 4 MG/ML injection 4 mg Q2H PRN    Geremias

## 2017-08-25 NOTE — PROGRESS NOTES
Pain was severe last night, was having flashbacks. Ongoing rectal pressure today. Concerned about 1st BM. Voiding ok, some reduced flow. Eating. AVSS  Site dressed w/o blood. S/P hemorrhoidectomy, THD--POD #1  Pain--improved.   Norco.  Discussed warm

## 2017-08-25 NOTE — ANESTHESIA POSTPROCEDURE EVALUATION
[address] [place] [address] Patient Status:  Hospital Outpatient Surgery   Age/Gender 40year old male MRN [id number]   Location [address] Attending Kavon Keller MD   Hosp Day # 0 PCP [facility] MD, Stas England

## 2017-08-25 NOTE — BRIEF OP NOTE
Pre-Operative Diagnosis: HEMORRHOIDS, RECTAL BLEEDING     Post-Operative Diagnosis: HEMORRHOIDS, RECTAL BLEEDING     Procedure Performed:   Procedure(s):   HEMORRHOIDECTOMY, TRANSANAL HEMORRHOIDAL DEARTERIALIZATION WITH RECTOPEXY    Surgeon(s) and Role:

## 2017-08-26 VITALS
DIASTOLIC BLOOD PRESSURE: 58 MMHG | OXYGEN SATURATION: 100 % | TEMPERATURE: 98 F | HEART RATE: 67 BPM | RESPIRATION RATE: 20 BRPM | SYSTOLIC BLOOD PRESSURE: 110 MMHG | BODY MASS INDEX: 33.12 KG/M2 | HEIGHT: 67 IN | WEIGHT: 211 LBS

## 2017-08-26 NOTE — PLAN OF CARE
Problem: PAIN - ADULT  Goal: Verbalizes/displays adequate comfort level or patient's stated pain goal  INTERVENTIONS:  - Encourage pt to monitor pain and request assistance  - Assess pain using appropriate pain scale  - Administer analgesics based on type to Case Management Department for coordinating discharge planning if the patient needs post-hospital services based on physician/LIP order or complex needs related to functional status, cognitive ability or social support system   Outcome: Progressing

## 2017-08-26 NOTE — PROGRESS NOTES
[facility]  Progress Note    Alan Morrison Patient Status:  Observation    1975 MRN [id number]   [facility] 3NW-A Attending Chaparro Jamil MD   Hosp Day # 0 PCP Darren Tuttle MD, Daina Chacko MD     Subjective:  Patient is

## 2017-08-26 NOTE — PROGRESS NOTES
Written and verbal discharge instructions given to patient and spouseverbalize understanding. IV discontinued in LHangio-cath tip intact, site free from redness, swelling, or drainage, patient denies pain at site. Dressing applied.   Prescriptions at home p

## 2017-08-29 NOTE — OPERATIVE REPORT
[address]    PATIENT'S NAME: Aamir Chanelle   ATTENDING PHYSICIAN: Tammi Tong M.D. OPERATING PHYSICIAN: Tammi Tong M.D.    PATIENT ACCOUNT#:   [de-identified]    LOCATION:  [address] [place] [address]  MEDICAL RECORD #:   [id number]       DATE OF BIRTH: general anesthesia. He was converted to the prone sherif-knife position. His buttocks were taped apart. The perineal area was prepped and draped. A time-out was performed. External examination revealed mild external hemorrhoidal disease.   There was pass sponge pull test demonstrated a minimal amount of residual hemorrhoidal disease at the 11 o'clock position. This was oversewn with a running and locking chromic suture without additional resection. All of the suture lines are noted to be hemostatic.   The

## 2017-08-29 NOTE — DISCHARGE SUMMARY
[facility]    PATIENT'S NAME: Fide Gonzalez   ATTENDING PHYSICIAN: Ana Maria Mota M.D.    PATIENT ACCOUNT#:   [de-identified]    LOCATION:  [address] [place] [address]  MEDICAL RECORD #:   [id number]       YOB: 1975  ADMISSION DATE:       08/24/20

## 2018-12-16 ENCOUNTER — NURSE ONLY (OUTPATIENT)
Dept: FAMILY MEDICINE CLINIC | Facility: CLINIC | Age: 43
End: 2018-12-16
Payer: COMMERCIAL

## 2018-12-16 VITALS
DIASTOLIC BLOOD PRESSURE: 80 MMHG | HEIGHT: 67 IN | WEIGHT: 230 LBS | OXYGEN SATURATION: 98 % | TEMPERATURE: 99 F | RESPIRATION RATE: 16 BRPM | BODY MASS INDEX: 36.1 KG/M2 | SYSTOLIC BLOOD PRESSURE: 128 MMHG | HEART RATE: 68 BPM

## 2018-12-16 DIAGNOSIS — H69.82 ETD (EUSTACHIAN TUBE DYSFUNCTION), LEFT: ICD-10-CM

## 2018-12-16 DIAGNOSIS — J06.9 VIRAL URI WITH COUGH: ICD-10-CM

## 2018-12-16 DIAGNOSIS — J02.9 SORE THROAT: Primary | ICD-10-CM

## 2018-12-16 PROCEDURE — 99202 OFFICE O/P NEW SF 15 MIN: CPT | Performed by: PHYSICIAN ASSISTANT

## 2018-12-16 PROCEDURE — 87880 STREP A ASSAY W/OPTIC: CPT | Performed by: PHYSICIAN ASSISTANT

## 2018-12-16 RX ORDER — METHYLPREDNISOLONE 4 MG/1
TABLET ORAL
Qty: 1 KIT | Refills: 0 | Status: SHIPPED | OUTPATIENT
Start: 2018-12-16 | End: 2019-01-06 | Stop reason: ALTCHOICE

## 2018-12-16 RX ORDER — FLUTICASONE PROPIONATE 50 MCG
2 SPRAY, SUSPENSION (ML) NASAL DAILY
Qty: 1 INHALER | Refills: 0 | Status: SHIPPED | OUTPATIENT
Start: 2018-12-16

## 2018-12-16 NOTE — PATIENT INSTRUCTIONS
-Cool mist humidifier at night  -Warm tea with honey  -First try sudafed and flonase for 2-3 days. If no improvement may use medrol dose pack. Earache, No Infection (Adult)  Earaches can happen without an infection.  This occurs when air and fluid bleeding, talk with your doctor before using these medicines. Aspirin should never be used in anyone under 25years of age who is ill with a fever. It may cause severe liver damage.   · You may use over-the-counter decongestants such as phenylephrine or pse

## 2018-12-16 NOTE — PROGRESS NOTES
CHIEF COMPLAINT:   Patient presents with:  Nasal Congestion: ringing in ear on Left, cough, body aches x 4 days      HPI:   Mara He is a 44year old male who presents for URI sxs for  4 days.   Patient reports 99 temp, body aches, nasal congesti SKIN: no rashes,no suspicious lesions  HEAD: atraumatic, normocephalic. No tenderness on palpation of maxillary sinuses. Notenderness on palpation of frontal sinuses.   EYES: conjunctiva clear, EOM intact  EARS: TM's not erythematous, no bulging, no retra -Cool mist humidifier at night  -Warm tea with honey  -First try sudafed and flonase for 2-3 days. If no improvement may use medrol dose pack. Earache, No Infection (Adult)  Earaches can happen without an infection.  This occurs when air and fluid · You may use over-the-counter medicine as directed to control pain, unless another medicine was prescribed. If you have chronic liver or kidney disease or ever had a stomach ulcer or GI bleeding, talk with your doctor before using these medicines.  Aspirin

## 2019-01-06 ENCOUNTER — OFFICE VISIT (OUTPATIENT)
Dept: FAMILY MEDICINE CLINIC | Facility: CLINIC | Age: 44
End: 2019-01-06
Payer: COMMERCIAL

## 2019-01-06 VITALS
RESPIRATION RATE: 20 BRPM | OXYGEN SATURATION: 98 % | HEART RATE: 102 BPM | SYSTOLIC BLOOD PRESSURE: 118 MMHG | HEIGHT: 67 IN | DIASTOLIC BLOOD PRESSURE: 60 MMHG | BODY MASS INDEX: 35.79 KG/M2 | WEIGHT: 228 LBS | TEMPERATURE: 99 F

## 2019-01-06 DIAGNOSIS — J01.10 ACUTE FRONTAL SINUSITIS, RECURRENCE NOT SPECIFIED: Primary | ICD-10-CM

## 2019-01-06 PROCEDURE — 99213 OFFICE O/P EST LOW 20 MIN: CPT | Performed by: PHYSICIAN ASSISTANT

## 2019-01-06 RX ORDER — AMOXICILLIN AND CLAVULANATE POTASSIUM 875; 125 MG/1; MG/1
1 TABLET, FILM COATED ORAL 2 TIMES DAILY
Qty: 20 TABLET | Refills: 0 | Status: SHIPPED | OUTPATIENT
Start: 2019-01-06 | End: 2019-01-16

## 2019-01-06 NOTE — PATIENT INSTRUCTIONS
Self-Care for Sinusitis     Drinking plenty of water can help sinuses drain. Sinusitis can often be managed with self-care. Self-care can keep sinuses moist and make you feel more comfortable. Remember to follow your doctor's instructions closely.  This Acute sinusitis is irritation and swelling of the sinuses. It is usually caused by a viral infection after a common cold. Your doctor can help you find relief. What is acute sinusitis? Sinuses are air-filled spaces in the skull behind the face.  They are © 8432-5220 [employer] [address]. [address] [facility] [place], [address] [place]. All rights reserved. This information is not intended as a substitute for professional medical care. Always follow your healthcare professional's instructions.         Prevent Keeping your sinuses moist makes your mucus thinner. This allows your sinuses to drain better. And this helps prevent infection. Ask your doctor about these suggestions:  · Use a humidifier. Clean it often to remove any mold or mildew.   · Drink several gla

## 2019-01-06 NOTE — PROGRESS NOTES
CHIEF COMPLAINT:   Patient presents with:  Nasal Congestion    HPI:   Joy Milton is a 44year old male who presents for sinus congestion since 12/16. Symptoms have been worsening since onset.  Sinus congestion/pain is described as a pressure and is NEURO: No numbness or tingling in face.     EXAM:   /60   Pulse 102   Temp 99.2 °F (37.3 °C) (Oral)   Resp 20   Ht 67\"   Wt 228 lb   SpO2 98%   BMI 35.71 kg/m²   GENERAL: well developed, well nourished, and in no apparent distress  SKIN: no rashes, n Drinking extra fluids helps thin your mucus. This lets it drain from your sinuses more easily. Have a glass of water every hour or two. A humidifier helps in much the same way. Fluids can also offset the drying effects of certain medicines.  If you use a hu Sinuses are air-filled spaces in the skull behind the face. They are kept moist and clean by a lining of mucosa. Things such as pollen, smoke, and chemical fumes can irritate the mucosa. It can then swell up.  As a response to irritation, the mucosa makes m © 2624-6967 [employer] [address]. [address] [facility] [place], [address]. All rights reserved. This information is not intended as a substitute for professional medical care. Always follow your healthcare professional's instructions.         Prevent Keeping your sinuses moist makes your mucus thinner. This allows your sinuses to drain better. And this helps prevent infection. Ask your doctor about these suggestions:  · Use a humidifier. Clean it often to remove any mold or mildew.   · Drink several gla

## 2019-04-28 ENCOUNTER — NURSE ONLY (OUTPATIENT)
Dept: FAMILY MEDICINE CLINIC | Facility: CLINIC | Age: 44
End: 2019-04-28
Payer: COMMERCIAL

## 2019-04-28 VITALS
BODY MASS INDEX: 35.63 KG/M2 | TEMPERATURE: 99 F | SYSTOLIC BLOOD PRESSURE: 104 MMHG | RESPIRATION RATE: 17 BRPM | HEART RATE: 87 BPM | OXYGEN SATURATION: 99 % | WEIGHT: 227 LBS | HEIGHT: 67 IN | DIASTOLIC BLOOD PRESSURE: 72 MMHG

## 2019-04-28 DIAGNOSIS — J20.9 ACUTE BRONCHITIS, UNSPECIFIED ORGANISM: Primary | ICD-10-CM

## 2019-04-28 DIAGNOSIS — R68.89 FLU-LIKE SYMPTOMS: ICD-10-CM

## 2019-04-28 PROCEDURE — 87502 INFLUENZA DNA AMP PROBE: CPT | Performed by: PHYSICIAN ASSISTANT

## 2019-04-28 PROCEDURE — 99213 OFFICE O/P EST LOW 20 MIN: CPT | Performed by: PHYSICIAN ASSISTANT

## 2019-04-28 RX ORDER — OSELTAMIVIR PHOSPHATE 75 MG/1
CAPSULE ORAL
Refills: 0 | COMMUNITY
Start: 2019-04-22

## 2019-04-28 RX ORDER — AZITHROMYCIN 250 MG/1
TABLET, FILM COATED ORAL
Qty: 6 TABLET | Refills: 0 | Status: SHIPPED | OUTPATIENT
Start: 2019-04-28

## 2019-04-28 RX ORDER — BENZONATATE 200 MG/1
200 CAPSULE ORAL 3 TIMES DAILY PRN
Qty: 21 CAPSULE | Refills: 0 | Status: SHIPPED | OUTPATIENT
Start: 2019-04-28 | End: 2019-05-05

## 2019-04-28 NOTE — PROGRESS NOTES
CHIEF COMPLAINT:     Patient presents with:  Cough: Dry/productive cough,yellow sputum X 4 days, SOB. Son were dx with flu B, Primary ordered oseltamivir. Patient stopped due to nausea from medication. Fever: 100.0 X 3 days.    Body ache and/or chills: X4 MUSCULOSKELETAL: no arthralgia or swollen joints  LYMPH:  Denies lymphadenopathy  NEURO: Denies headaches or lightheadedness      EXAM:   /72   Pulse 87   Temp 98.8 °F (37.1 °C) (Oral)   Resp 17   Ht 67\"   Wt 227 lb   SpO2 99%   BMI 35.55 kg/m²   GE Bronchitis is an infection of the air passages (bronchial tubes) in your lungs. It often occurs when you have a cold.  This illness is contagious during the first few days and is spread through the air by coughing and sneezing, or by direct contact (touchin Follow up with your healthcare provider, or as advised. If you had an X-ray or ECG (electrocardiogram), a specialist will review it. You will be notified of any new findings that may affect your care.   If you are age 72 or older, or if you have a chronic l

## 2019-04-28 NOTE — PATIENT INSTRUCTIONS
1. Azithromycin  2. At home albuterol nebulizer  3. Tessalon  4. Follow up with PCP  5. If worsening symptoms seek treatment    Bronchitis, Antibiotic Treatment (Adult)    Bronchitis is an infection of the air passages (bronchial tubes) in your lungs.  It · Over-the-counter cough, cold, and sore-throat medicines will not shorten the length of the illness, but they may be helpful to reduce symptoms. (Note: Do not use decongestants if you have high blood pressure.)  · Finish all antibiotic medicine.  Do this e

## 2021-08-29 ENCOUNTER — APPOINTMENT (OUTPATIENT)
Dept: GENERAL RADIOLOGY | Age: 46
End: 2021-08-29
Attending: EMERGENCY MEDICINE
Payer: COMMERCIAL

## 2021-08-29 ENCOUNTER — HOSPITAL ENCOUNTER (EMERGENCY)
Age: 46
Discharge: HOME | End: 2021-08-30
Attending: EMERGENCY MEDICINE
Payer: COMMERCIAL

## 2021-08-29 DIAGNOSIS — U07.1 PNEUMONIA DUE TO COVID-19 VIRUS: Primary | ICD-10-CM

## 2021-08-29 DIAGNOSIS — J12.82 PNEUMONIA DUE TO COVID-19 VIRUS: Primary | ICD-10-CM

## 2021-08-29 LAB
ALBUMIN SERPL-MCNC: 3 G/DL (ref 3.4–5)
ALBUMIN/GLOB SERPL: 0.7 {RATIO} (ref 1–2)
ALP LIVER SERPL-CCNC: 46 U/L
ALT SERPL-CCNC: 50 U/L
ANION GAP SERPL CALC-SCNC: 5 MMOL/L (ref 0–18)
AST SERPL-CCNC: 30 U/L (ref 15–37)
BASOPHILS # BLD AUTO: 0.01 X10(3) UL (ref 0–0.2)
BASOPHILS NFR BLD AUTO: 0.3 %
BILIRUB SERPL-MCNC: 0.3 MG/DL (ref 0.1–2)
BUN BLD-MCNC: 10 MG/DL (ref 7–18)
CALCIUM BLD-MCNC: 8.9 MG/DL (ref 8.5–10.1)
CHLORIDE SERPL-SCNC: 106 MMOL/L (ref 98–112)
CK SERPL-CCNC: 134 U/L
CO2 SERPL-SCNC: 26 MMOL/L (ref 21–32)
CREAT BLD-MCNC: 0.9 MG/DL
CRP SERPL-MCNC: 0.9 MG/DL (ref ?–0.3)
D-DIMER: 0.79 UG/ML FEU (ref ?–0.5)
EOSINOPHIL # BLD AUTO: 0.11 X10(3) UL (ref 0–0.7)
EOSINOPHIL NFR BLD AUTO: 2.8 %
ERYTHROCYTE [DISTWIDTH] IN BLOOD BY AUTOMATED COUNT: 13.4 %
GLOBULIN PLAS-MCNC: 4.3 G/DL (ref 2.8–4.4)
GLUCOSE BLD-MCNC: 109 MG/DL (ref 70–99)
HCT VFR BLD AUTO: 44.2 %
HGB BLD-MCNC: 13.9 G/DL
IMM GRANULOCYTES # BLD AUTO: 0.01 X10(3) UL (ref 0–1)
IMM GRANULOCYTES NFR BLD: 0.3 %
LDH SERPL L TO P-CCNC: 245 U/L
LYMPHOCYTES # BLD AUTO: 1.44 X10(3) UL (ref 1–4)
LYMPHOCYTES NFR BLD AUTO: 36.2 %
M PROTEIN MFR SERPL ELPH: 7.3 G/DL (ref 6.4–8.2)
MCH RBC QN AUTO: 27 PG (ref 26–34)
MCHC RBC AUTO-ENTMCNC: 31.4 G/DL (ref 31–37)
MCV RBC AUTO: 86 FL
MONOCYTES # BLD AUTO: 0.25 X10(3) UL (ref 0.1–1)
MONOCYTES NFR BLD AUTO: 6.3 %
NEUTROPHILS # BLD AUTO: 2.16 X10 (3) UL (ref 1.5–7.7)
NEUTROPHILS # BLD AUTO: 2.16 X10(3) UL (ref 1.5–7.7)
NEUTROPHILS NFR BLD AUTO: 54.1 %
NT-PROBNP SERPL-MCNC: 47 PG/ML (ref ?–125)
OSMOLALITY SERPL CALC.SUM OF ELEC: 284 MOSM/KG (ref 275–295)
PLATELET # BLD AUTO: 417 10(3)UL (ref 150–450)
POTASSIUM SERPL-SCNC: 4.3 MMOL/L (ref 3.5–5.1)
RBC # BLD AUTO: 5.14 X10(6)UL
SODIUM SERPL-SCNC: 137 MMOL/L (ref 136–145)
TROPONIN I SERPL-MCNC: <0.045 NG/ML (ref ?–0.04)
WBC # BLD AUTO: 4 X10(3) UL (ref 4–11)

## 2021-08-29 PROCEDURE — 87040 BLOOD CULTURE FOR BACTERIA: CPT | Performed by: EMERGENCY MEDICINE

## 2021-08-29 PROCEDURE — 85379 FIBRIN DEGRADATION QUANT: CPT | Performed by: EMERGENCY MEDICINE

## 2021-08-29 PROCEDURE — 80053 COMPREHEN METABOLIC PANEL: CPT | Performed by: EMERGENCY MEDICINE

## 2021-08-29 PROCEDURE — 87205 SMEAR GRAM STAIN: CPT | Performed by: EMERGENCY MEDICINE

## 2021-08-29 PROCEDURE — 81003 URINALYSIS AUTO W/O SCOPE: CPT | Performed by: EMERGENCY MEDICINE

## 2021-08-29 PROCEDURE — 86140 C-REACTIVE PROTEIN: CPT | Performed by: EMERGENCY MEDICINE

## 2021-08-29 PROCEDURE — 85025 COMPLETE CBC W/AUTO DIFF WBC: CPT | Performed by: EMERGENCY MEDICINE

## 2021-08-29 PROCEDURE — 83880 ASSAY OF NATRIURETIC PEPTIDE: CPT | Performed by: EMERGENCY MEDICINE

## 2021-08-29 PROCEDURE — 84145 PROCALCITONIN (PCT): CPT | Performed by: EMERGENCY MEDICINE

## 2021-08-29 PROCEDURE — 99284 EMERGENCY DEPT VISIT MOD MDM: CPT

## 2021-08-29 PROCEDURE — 36415 COLL VENOUS BLD VENIPUNCTURE: CPT

## 2021-08-29 PROCEDURE — 87070 CULTURE OTHR SPECIMN AEROBIC: CPT | Performed by: EMERGENCY MEDICINE

## 2021-08-29 PROCEDURE — 84484 ASSAY OF TROPONIN QUANT: CPT | Performed by: EMERGENCY MEDICINE

## 2021-08-29 PROCEDURE — 83615 LACTATE (LD) (LDH) ENZYME: CPT | Performed by: EMERGENCY MEDICINE

## 2021-08-29 PROCEDURE — 82550 ASSAY OF CK (CPK): CPT | Performed by: EMERGENCY MEDICINE

## 2021-08-29 PROCEDURE — 71045 X-RAY EXAM CHEST 1 VIEW: CPT | Performed by: EMERGENCY MEDICINE

## 2021-08-29 PROCEDURE — 82728 ASSAY OF FERRITIN: CPT | Performed by: EMERGENCY MEDICINE

## 2021-08-30 VITALS
DIASTOLIC BLOOD PRESSURE: 81 MMHG | BODY MASS INDEX: 34.53 KG/M2 | RESPIRATION RATE: 18 BRPM | OXYGEN SATURATION: 97 % | WEIGHT: 220 LBS | TEMPERATURE: 98 F | SYSTOLIC BLOOD PRESSURE: 124 MMHG | HEART RATE: 61 BPM | HEIGHT: 67 IN

## 2021-08-30 LAB
BILIRUB UR QL STRIP.AUTO: NEGATIVE
CLARITY UR REFRACT.AUTO: CLEAR
COLOR UR AUTO: YELLOW
DEPRECATED HBV CORE AB SER IA-ACNC: 200 NG/ML
GLUCOSE UR STRIP.AUTO-MCNC: NEGATIVE MG/DL
KETONES UR STRIP.AUTO-MCNC: NEGATIVE MG/DL
LEUKOCYTE ESTERASE UR QL STRIP.AUTO: NEGATIVE
NITRITE UR QL STRIP.AUTO: NEGATIVE
PH UR STRIP.AUTO: 6 [PH] (ref 5–8)
PROCALCITONIN SERPL-MCNC: <0.05 NG/ML (ref ?–0.16)
PROT UR STRIP.AUTO-MCNC: NEGATIVE MG/DL
RBC UR QL AUTO: NEGATIVE
SP GR UR STRIP.AUTO: 1.02 (ref 1–1.03)
TROPONIN I SERPL-MCNC: <0.045 NG/ML (ref ?–0.04)
UROBILINOGEN UR STRIP.AUTO-MCNC: 0.2 MG/DL

## 2021-08-30 PROCEDURE — 84484 ASSAY OF TROPONIN QUANT: CPT | Performed by: EMERGENCY MEDICINE

## 2021-08-30 RX ORDER — ONDANSETRON 4 MG/1
4 TABLET, ORALLY DISINTEGRATING ORAL EVERY 4 HOURS PRN
Qty: 10 TABLET | Refills: 0 | Status: SHIPPED | OUTPATIENT
Start: 2021-08-30 | End: 2021-09-06

## 2021-08-30 NOTE — ED PROVIDER NOTES
Patient Seen in: THE [facility] Emergency Department In [place]      History   Patient presents with:  Fatigue  Covid    Stated Complaint: pt c/o fatigue, and light headed.  positive for covid 8/17    HPI/Subjective:   HPI    60-year-old male presents reportin 170.2 cm (5' 7\")   Wt 99.8 kg   SpO2 97%   BMI 34.46 kg/m²         Physical Exam    General:  Vitals as listed. Appears mildly ill  HEENT: Sclerae anicteric. Conjunctivae show no pallor.   Oropharynx clear, mucous membranes moist   Neck: supple, no rigid (CPT=71045)    Result Date: 8/29/2021  PROCEDURE:  XR CHEST AP PORTABLE  (CPT=71045)  TECHNIQUE:  AP chest radiograph was obtained. COMPARISON:  None. INDICATIONS:  pt c/o fatigue, and light headed.  positive for covid 8/17  PATIENT STATED HISTORY: (As tr Disposition:  Discharge  8/30/2021  1:35 am    Follow-up:  Bran Jimenez MD  [address] [facility]  [place] [phone]    Call      THE [facility] Emergency Department in [address] [place] [address]

## 2021-08-30 NOTE — ED QUICK NOTES
Pt ambulatory around unit with steady. O2 saturations ranging between 95 and 96% on room air. MD aware.

## 2021-08-30 NOTE — ED INITIAL ASSESSMENT (HPI)
Pt c/o fatigue, shortness of breath, productive cough (clear phlegm), and a fever. He tested positive for COVID on 8/17.

## 2023-11-12 ENCOUNTER — APPOINTMENT (OUTPATIENT)
Dept: GENERAL RADIOLOGY | Age: 48
End: 2023-11-12
Attending: EMERGENCY MEDICINE
Payer: COMMERCIAL

## 2023-11-12 ENCOUNTER — HOSPITAL ENCOUNTER (EMERGENCY)
Age: 48
Discharge: HOME | End: 2023-11-12
Attending: EMERGENCY MEDICINE
Payer: COMMERCIAL

## 2023-11-12 VITALS
SYSTOLIC BLOOD PRESSURE: 130 MMHG | OXYGEN SATURATION: 99 % | BODY MASS INDEX: 37.67 KG/M2 | HEART RATE: 62 BPM | HEIGHT: 67 IN | RESPIRATION RATE: 16 BRPM | WEIGHT: 240 LBS | TEMPERATURE: 97 F | DIASTOLIC BLOOD PRESSURE: 70 MMHG

## 2023-11-12 DIAGNOSIS — R07.9 CHEST PAIN OF UNCERTAIN ETIOLOGY: Primary | ICD-10-CM

## 2023-11-12 LAB
ALBUMIN SERPL-MCNC: 3.5 G/DL (ref 3.4–5)
ALBUMIN/GLOB SERPL: 0.8 {RATIO} (ref 1–2)
ALP LIVER SERPL-CCNC: 66 U/L
ALT SERPL-CCNC: 46 U/L
ANION GAP SERPL CALC-SCNC: 3 MMOL/L (ref 0–18)
AST SERPL-CCNC: 25 U/L (ref 15–37)
ATRIAL RATE: 68 BPM
BASOPHILS # BLD AUTO: 0.05 X10(3) UL (ref 0–0.2)
BASOPHILS NFR BLD AUTO: 0.7 %
BILIRUB SERPL-MCNC: 0.2 MG/DL (ref 0.1–2)
BUN BLD-MCNC: 19 MG/DL (ref 9–23)
CALCIUM BLD-MCNC: 8.7 MG/DL (ref 8.5–10.1)
CHLORIDE SERPL-SCNC: 104 MMOL/L (ref 98–112)
CO2 SERPL-SCNC: 28 MMOL/L (ref 21–32)
CREAT BLD-MCNC: 1.15 MG/DL
D DIMER PPP FEU-MCNC: 0.37 UG/ML FEU (ref ?–0.5)
EGFRCR SERPLBLD CKD-EPI 2021: 79 ML/MIN/1.73M2 (ref 60–?)
EOSINOPHIL # BLD AUTO: 0.15 X10(3) UL (ref 0–0.7)
EOSINOPHIL NFR BLD AUTO: 2.2 %
ERYTHROCYTE [DISTWIDTH] IN BLOOD BY AUTOMATED COUNT: 13.3 %
GLOBULIN PLAS-MCNC: 4.2 G/DL (ref 2.8–4.4)
GLUCOSE BLD-MCNC: 118 MG/DL (ref 70–99)
HCT VFR BLD AUTO: 46.9 %
HGB BLD-MCNC: 14.8 G/DL
IMM GRANULOCYTES # BLD AUTO: 0.01 X10(3) UL (ref 0–1)
IMM GRANULOCYTES NFR BLD: 0.1 %
LYMPHOCYTES # BLD AUTO: 2.17 X10(3) UL (ref 1–4)
LYMPHOCYTES NFR BLD AUTO: 32.2 %
MCH RBC QN AUTO: 27.1 PG (ref 26–34)
MCHC RBC AUTO-ENTMCNC: 31.6 G/DL (ref 31–37)
MCV RBC AUTO: 85.7 FL
MONOCYTES # BLD AUTO: 0.51 X10(3) UL (ref 0.1–1)
MONOCYTES NFR BLD AUTO: 7.6 %
NEUTROPHILS # BLD AUTO: 3.85 X10 (3) UL (ref 1.5–7.7)
NEUTROPHILS # BLD AUTO: 3.85 X10(3) UL (ref 1.5–7.7)
NEUTROPHILS NFR BLD AUTO: 57.2 %
OSMOLALITY SERPL CALC.SUM OF ELEC: 283 MOSM/KG (ref 275–295)
P AXIS: 42 DEGREES
P-R INTERVAL: 172 MS
PLATELET # BLD AUTO: 331 10(3)UL (ref 150–450)
POTASSIUM SERPL-SCNC: 3.9 MMOL/L (ref 3.5–5.1)
PROT SERPL-MCNC: 7.7 G/DL (ref 6.4–8.2)
Q-T INTERVAL: 402 MS
QRS DURATION: 90 MS
QTC CALCULATION (BEZET): 427 MS
R AXIS: -8 DEGREES
RBC # BLD AUTO: 5.47 X10(6)UL
SODIUM SERPL-SCNC: 135 MMOL/L (ref 136–145)
T AXIS: 41 DEGREES
TROPONIN I SERPL HS-MCNC: 4 NG/L
VENTRICULAR RATE: 68 BPM
WBC # BLD AUTO: 6.7 X10(3) UL (ref 4–11)

## 2023-11-12 PROCEDURE — 99285 EMERGENCY DEPT VISIT HI MDM: CPT

## 2023-11-12 PROCEDURE — 93005 ELECTROCARDIOGRAM TRACING: CPT

## 2023-11-12 PROCEDURE — 36415 COLL VENOUS BLD VENIPUNCTURE: CPT

## 2023-11-12 PROCEDURE — 93010 ELECTROCARDIOGRAM REPORT: CPT

## 2023-11-12 PROCEDURE — 85025 COMPLETE CBC W/AUTO DIFF WBC: CPT | Performed by: EMERGENCY MEDICINE

## 2023-11-12 PROCEDURE — 99284 EMERGENCY DEPT VISIT MOD MDM: CPT

## 2023-11-12 PROCEDURE — 80053 COMPREHEN METABOLIC PANEL: CPT | Performed by: EMERGENCY MEDICINE

## 2023-11-12 PROCEDURE — 84484 ASSAY OF TROPONIN QUANT: CPT | Performed by: EMERGENCY MEDICINE

## 2023-11-12 PROCEDURE — 71045 X-RAY EXAM CHEST 1 VIEW: CPT | Performed by: EMERGENCY MEDICINE

## 2023-11-12 PROCEDURE — 85379 FIBRIN DEGRADATION QUANT: CPT | Performed by: EMERGENCY MEDICINE

## 2023-11-12 NOTE — ED INITIAL ASSESSMENT (HPI)
For past week c/o tightness to l upper chest- today felt a \"flutter\" to area with sob and pain to l shoulder blade

## 2023-11-12 NOTE — DISCHARGE INSTRUCTIONS
Follow-up with your primary care doctor or cardiologist in the next week for reassessment. Return for new or worsening pain, difficulty breathing or any other concerning symptoms.

## (undated) DEVICE — Device: Brand: DEFENDO AIR/WATER/SUCTION AND BIOPSY VALVE

## (undated) DEVICE — 3M(TM) MICROPORE TAPE DISPENSER 1535-2: Brand: 3M™ MICROPORE™

## (undated) DEVICE — LUBRICANT JLY SURGILUBE 2OZ

## (undated) DEVICE — 3M™ RED DOT™ MONITORING ELECTRODE WITH FOAM TAPE AND STICKY GEL, 50/BAG, 20/CASE, 72/PLT 2570: Brand: RED DOT™

## (undated) DEVICE — KIT THD SLIDE..

## (undated) DEVICE — SUTURE CHROMIC GUT 2-0 SH

## (undated) DEVICE — GLOVE SURG SENSICARE SZ 7

## (undated) DEVICE — KENDALL SCD EXPRESS SLEEVES, KNEE LENGTH, MEDIUM: Brand: KENDALL SCD

## (undated) DEVICE — SOL  .9 1000ML BTL

## (undated) DEVICE — RECTAL CDS-LF: Brand: MEDLINE INDUSTRIES, INC.

## (undated) DEVICE — GAUZE SPONGES,USP TYPE VII GAUZE, 12 PLY: Brand: CURITY

## (undated) DEVICE — 1200CC GUARDIAN II: Brand: GUARDIAN

## (undated) DEVICE — Device

## (undated) DEVICE — GLOVE SURG TRIUMPH SZ 71/2

## (undated) DEVICE — ENDOSCOPY PACK - LOWER: Brand: MEDLINE INDUSTRIES, INC.

## (undated) DEVICE — FILTERLINE NASAL ADULT O2/CO2

## (undated) NOTE — LETTER
[facility]  Eb Magalysjose alejandro [id number] [facility], [address] [place] [address]    Consent for Operation    Date:AUGUST 20, 2017 __________________    Time: _______________    1.  I authorize the performance upon Juvenal Melgoza the following operation:    * COLONOSCOPY ** videotape. The [facility] will not be responsible for storage or maintenance of this tape. 6. For the purpose of advancing medical education, I consent to the admittance of observers to the Operating Room.     7. I authorize the use of any specimen, organs Signature of Patient:   ___________________________    When the patient is a minor or mentally incompetent to give consent:  Signature of person authorized to consent for patient: ___________________________   Relationship to patient: _____________________ drugs/illegal medications). Failure to inform my anesthesiologist about these medicines may increase my risk of anesthetic complications. · If I am allergic to anything or have had a reaction to anesthesia before.     3. I understand how the anesthesia med I have discussed the procedure and information above with the patient (or patient’s representative) and answered their questions. The patient or their representative has agreed to have anesthesia services.     _______________________________________________

## (undated) NOTE — LETTER
[facility]  Bladimir Magalysjose alej[address] 62 [id number] [facility], [address] [place] [address]    Consent for Operation    Date: __________________    Time: _______________    1. I authorize the performance upon Valencia Sanchez the following operation:    Procedure(s):   HEMORRHOIDECTOMY procedure has been videotaped, the surgeon will obtain the original videotape. The hospital will not be responsible for storage or maintenance of this tape.     6. For the purpose of advancing medical education, I consent to the admittance of observers to t STATEMENTS REQUIRING INSERTION OR COMPLETION WERE FILLED IN.     Signature of Patient:   ___________________________    When the patient is a minor or mentally incompetent to give consent:  Signature of person authorized to consent for patient: ____________ supplements, and pills I can buy without a prescription (including street drugs/illegal medications). Failure to inform my anesthesiologist about these medicines may increase my risk of anesthetic complications.   · If I am allergic to anything or have had Anesthesiologist Signature     Date   Time  I have discussed the procedure and information above with the patient (or patient’s representative) and answered their questions. The patient or their representative has agreed to have anesthesia services.     ___